# Patient Record
Sex: FEMALE | Race: AMERICAN INDIAN OR ALASKA NATIVE
[De-identification: names, ages, dates, MRNs, and addresses within clinical notes are randomized per-mention and may not be internally consistent; named-entity substitution may affect disease eponyms.]

---

## 2018-08-27 ENCOUNTER — HOSPITAL ENCOUNTER (INPATIENT)
Dept: HOSPITAL 5 - ED | Age: 68
LOS: 1 days | Discharge: HOME | DRG: 69 | End: 2018-08-28
Attending: INTERNAL MEDICINE | Admitting: INTERNAL MEDICINE
Payer: MEDICARE

## 2018-08-27 DIAGNOSIS — E66.9: ICD-10-CM

## 2018-08-27 DIAGNOSIS — I10: ICD-10-CM

## 2018-08-27 DIAGNOSIS — G45.9: Primary | ICD-10-CM

## 2018-08-27 DIAGNOSIS — R26.81: ICD-10-CM

## 2018-08-27 DIAGNOSIS — Z82.49: ICD-10-CM

## 2018-08-27 LAB
ALBUMIN SERPL-MCNC: 3.7 G/DL (ref 3.9–5)
ALT SERPL-CCNC: 14 UNITS/L (ref 7–56)
APTT BLD: 27.7 SEC. (ref 24.2–36.6)
BASOPHILS # (AUTO): 0 K/MM3 (ref 0–0.1)
BASOPHILS NFR BLD AUTO: 0.6 % (ref 0–1.8)
BUN SERPL-MCNC: 11 MG/DL (ref 7–17)
BUN/CREAT SERPL: 18 %
CALCIUM SERPL-MCNC: 9.5 MG/DL (ref 8.4–10.2)
EOSINOPHIL # BLD AUTO: 0.2 K/MM3 (ref 0–0.4)
EOSINOPHIL NFR BLD AUTO: 2.5 % (ref 0–4.3)
HCT VFR BLD CALC: 37.3 % (ref 30.3–42.9)
HEMOLYSIS INDEX: 24
HGB BLD-MCNC: 12.2 GM/DL (ref 10.1–14.3)
INR PPP: 1 (ref 0.87–1.13)
LYMPHOCYTES # BLD AUTO: 3.4 K/MM3 (ref 1.2–5.4)
LYMPHOCYTES NFR BLD AUTO: 43.9 % (ref 13.4–35)
MCH RBC QN AUTO: 30 PG (ref 28–32)
MCHC RBC AUTO-ENTMCNC: 33 % (ref 30–34)
MCV RBC AUTO: 91 FL (ref 79–97)
MONOCYTES # (AUTO): 0.6 K/MM3 (ref 0–0.8)
MONOCYTES % (AUTO): 8.2 % (ref 0–7.3)
PLATELET # BLD: 366 K/MM3 (ref 140–440)
RBC # BLD AUTO: 4.12 M/MM3 (ref 3.65–5.03)

## 2018-08-27 PROCEDURE — 85610 PROTHROMBIN TIME: CPT

## 2018-08-27 PROCEDURE — 84484 ASSAY OF TROPONIN QUANT: CPT

## 2018-08-27 PROCEDURE — 93010 ELECTROCARDIOGRAM REPORT: CPT

## 2018-08-27 PROCEDURE — 70450 CT HEAD/BRAIN W/O DYE: CPT

## 2018-08-27 PROCEDURE — 85730 THROMBOPLASTIN TIME PARTIAL: CPT

## 2018-08-27 PROCEDURE — 85025 COMPLETE CBC W/AUTO DIFF WBC: CPT

## 2018-08-27 PROCEDURE — 71045 X-RAY EXAM CHEST 1 VIEW: CPT

## 2018-08-27 PROCEDURE — 82553 CREATINE MB FRACTION: CPT

## 2018-08-27 PROCEDURE — 93306 TTE W/DOPPLER COMPLETE: CPT

## 2018-08-27 PROCEDURE — 81001 URINALYSIS AUTO W/SCOPE: CPT

## 2018-08-27 PROCEDURE — 36415 COLL VENOUS BLD VENIPUNCTURE: CPT

## 2018-08-27 PROCEDURE — 70551 MRI BRAIN STEM W/O DYE: CPT

## 2018-08-27 PROCEDURE — 70544 MR ANGIOGRAPHY HEAD W/O DYE: CPT

## 2018-08-27 PROCEDURE — 80061 LIPID PANEL: CPT

## 2018-08-27 PROCEDURE — 93005 ELECTROCARDIOGRAM TRACING: CPT

## 2018-08-27 PROCEDURE — 80053 COMPREHEN METABOLIC PANEL: CPT

## 2018-08-27 PROCEDURE — 93880 EXTRACRANIAL BILAT STUDY: CPT

## 2018-08-27 PROCEDURE — 70496 CT ANGIOGRAPHY HEAD: CPT

## 2018-08-27 PROCEDURE — 85670 THROMBIN TIME PLASMA: CPT

## 2018-08-27 PROCEDURE — 70498 CT ANGIOGRAPHY NECK: CPT

## 2018-08-27 PROCEDURE — 82962 GLUCOSE BLOOD TEST: CPT

## 2018-08-27 PROCEDURE — 82550 ASSAY OF CK (CPK): CPT

## 2018-08-27 NOTE — CAT SCAN REPORT
FINAL REPORT



EXAM:  CT ANGIO NECK



HISTORY:  Slurred Speech 



COMPARISON:  None.



TECHNIQUE:  Multiple contiguous axial images were obtained

through the neck without administration of IV contrast.

Reformatted sagittal and coronal images were available for

review. 3D reconstruction of the vascular structures was also

performed.



FINDINGS:  

There is a conventional branching pattern of the aortic arch.



There is no significant atherosclerotic disease.



The right common carotid artery is patent and normal in caliber.

The right internal and external carotid arteries are patent and

normal in caliber without evidence of dissection or aneurysm.

There is no stenosis.



The left common carotid artery is patent and normal in caliber.

The left internal and external carotid arteries are patent and

normal in caliber without evidence of dissection or aneurysm.

There is no stenosis.



The bilateral vertebral arteries are patent and normal in

caliber.



There is a heterogeneous appearing thyroid gland. The airway is

patent. The lung apices are clear.



The visualized bones are normal.



IMPRESSION:  

Normal CT angiogram of the neck. No evidence for stenosis.

## 2018-08-27 NOTE — EMERGENCY DEPARTMENT REPORT
ED Neuro Deficit HPI





- General


Chief Complaint: Neuro Symptoms/Deficit


Stated Complaint: POSS CVA


Time Seen by Provider: 08/27/18 10:31


Source: patient, EMS


Mode of arrival: Stretcher


Limitations: No Limitations





- History of Present Illness


Initial Comments: 





According to EMS, patient was last seen well yesterday before she went to bed 

last night.  She had slowed speech and unsteady gait last night before she went 

to bed.  She woke up this morning with the same symptoms of slow speech and 

unsteady gait.  Patient's family called EMS because of the symptoms.  On arrival

, EMS noted his blood pressure was 178/110.  Patient obviously has slurred 

speech.


-: Sudden, Last night


Last Observed Normal: 13:00


Location: speech


Presenting Symptoms: Present: Unable to Speak Clearly


History of same: No


Place: home


Severity: Unable to Determine


Quality: constant


Improves With: none


Worsens With: none


Associated Symptoms: confusion


Treatments Prior to Arrival: none





- Related Data


Home Medications: 


 Home Medications











 Medication  Instructions  Recorded  Confirmed  Last Taken


 


No Known Home Medications [No  08/27/18 08/27/18 Unknown





Reported Home Medications]    











Allergies/Adverse Reactions: 


 Allergies











Allergy/AdvReac Type Severity Reaction Status Date / Time


 


No Known Allergies Allergy   Unverified 08/27/18 10:35














ED Review of Systems


ROS: 


Stated complaint: POSS CVA


Other details as noted in HPI





Comment: All other systems reviewed and negative


Constitutional: denies: chills, fever


Eyes: denies: eye pain


ENT: denies: ear pain, throat pain


Respiratory: denies: cough, shortness of breath


Cardiovascular: denies: chest pain, palpitations


Endocrine: no symptoms reported


Gastrointestinal: denies: abdominal pain, nausea, vomiting


Genitourinary: denies: urgency, dysuria


Musculoskeletal: denies: back pain


Skin: denies: rash, lesions


Neurological: denies: headache, weakness, numbness


Psychiatric: denies: anxiety, depression, auditory hallucinations


Hematological/Lymphatic: denies: easy bleeding, easy bruising





ED Past Medical Hx





- Past Medical History


Hx Hypertension: Yes





- Social History


Smoking Status: Never Smoker


Substance Use Type: None





- Medications


Home Medications: 


 Home Medications











 Medication  Instructions  Recorded  Confirmed  Last Taken  Type


 


No Known Home Medications [No  08/27/18 08/27/18 Unknown History





Reported Home Medications]     














ED Neuro Physical Exam





- General


Limitations: No Limitations


General appearance: alert, in no apparent distress


Suspected Stroke: Yes





- Head


Head exam: Present: atraumatic, normocephalic, normal inspection





- Eye


Eye exam: Present: normal appearance, PERRL, EOMI


Pupils: Present: normal accommodation





- ENT


ENT exam: Present: normal exam, normal orophraynx, mucous membranes moist





- Neck


Neck exam: Present: normal inspection, full ROM.  Absent: tenderness





- Respiratory


Respiratory exam: Present: normal lung sounds bilaterally.  Absent: respiratory 

distress, wheezes, rales, rhonchi, stridor





- Cardiovascular


Cardiovascular Exam: Present: regular rate, normal rhythm, normal heart sounds





- GI/Abdominal


GI/Abdominal exam: Present: soft, normal bowel sounds.  Absent: distended, 

tenderness, guarding, rebound





- Extremities Exam


Extremities exam: Present: normal inspection, full ROM, normal capillary refill





- Back Exam


Back exam: Present: normal inspection, full ROM.  Absent: tenderness





- Neurological Exam


Neurological exam: Present: alert, oriented X3, CN II-XII intact





- NIHSS


Assessment Interval: Baseline


1a. Level of Consciousness: alert


1b. LOC Questions: answers correctly


1c. LOC Commands: performs tasks correctly


2. Best Gaze: normal


3. Visual: no visual loss


4. Facial Palsy: normal symmetrical movement


5b. Motor Arm Right: no drift


5a. Motor Arm Left: no drift


6a. Motor Leg Left: no drift


6b. Motor Leg Right: no drift


7. Limb Ataxia: absent


8. Sensory: normal


9. Best Language: mild/moderate aphasia


10. Dysarthria: mild/moderate dysarthria


11. Extinction/Inattention: no abnormality


Total Score: 2


Stroke Severity: Minor Stroke





- Psychiatric


Psychiatric exam: Present: normal affect





- Skin


Skin exam: Present: warm, dry, intact, normal color.  Absent: rash





ED Course


 Vital Signs











  08/27/18 08/27/18 08/27/18





  11:00 11:30 12:55


 


Pulse Rate  83 


 


Respiratory 16 28 H 





Rate   


 


Blood Pressure  153/92 153/92


 


O2 Sat by Pulse 98 99 100





Oximetry   














  08/27/18 08/27/18 08/27/18





  13:00 13:30 16:14


 


Pulse Rate  78 


 


Respiratory  17 





Rate   


 


Blood Pressure 157/84 154/85 154/85


 


O2 Sat by Pulse 100 96 97





Oximetry   














  08/27/18 08/27/18





  16:30 17:00


 


Pulse Rate  


 


Respiratory  





Rate  


 


Blood Pressure 156/88 145/82


 


O2 Sat by Pulse 100 98





Oximetry  














- Reevaluation(s)


Reevaluation #1: 





08/27/18 10:53


I consulted with the tele neurologist on call Dr. Janette Donnelly.  She recommend 

given the patient aspirin 81 mg by mouth.  She also patient admitted by the 

hospitalist for further evaluation and management.


08/27/18 13:07


I discussed patient with the hospitalist on call Dr. Spencer.  He will admit 

patient to the hospital for further evaluation and management.


08/27/18 13:38








- Lab Data


Result diagrams: 


 08/27/18 10:44





 08/27/18 10:44


 Lab Results











  08/27/18 08/27/18 08/27/18 Range/Units





  10:27 10:44 10:44 


 


WBC   7.8   (4.5-11.0)  K/mm3


 


RBC   4.12   (3.65-5.03)  M/mm3


 


Hgb   12.2   (10.1-14.3)  gm/dl


 


Hct   37.3   (30.3-42.9)  %


 


MCV   91   (79-97)  fl


 


MCH   30   (28-32)  pg


 


MCHC   33   (30-34)  %


 


RDW   13.2   (13.2-15.2)  %


 


Plt Count   366   (140-440)  K/mm3


 


Lymph % (Auto)   43.9 H   (13.4-35.0)  %


 


Mono % (Auto)   8.2 H   (0.0-7.3)  %


 


Eos % (Auto)   2.5   (0.0-4.3)  %


 


Baso % (Auto)   0.6   (0.0-1.8)  %


 


Lymph #   3.4   (1.2-5.4)  K/mm3


 


Mono #   0.6   (0.0-0.8)  K/mm3


 


Eos #   0.2   (0.0-0.4)  K/mm3


 


Baso #   0.0   (0.0-0.1)  K/mm3


 


Seg Neutrophils %   44.8   (40.0-70.0)  %


 


Seg Neutrophils #   3.5   (1.8-7.7)  K/mm3


 


PT    13.7  (12.2-14.9)  Sec.


 


INR    1.00  (0.87-1.13)  


 


APTT    27.7  (24.2-36.6)  Sec.


 


Thrombin Time    18.4  (15.1-19.6)  Sec.


 


Sodium     (137-145)  mmol/L


 


Potassium     (3.6-5.0)  mmol/L


 


Chloride     ()  mmol/L


 


Carbon Dioxide     (22-30)  mmol/L


 


Anion Gap     mmol/L


 


BUN     (7-17)  mg/dL


 


Creatinine     (0.7-1.2)  mg/dL


 


Estimated GFR     ml/min


 


BUN/Creatinine Ratio     %


 


Glucose     ()  mg/dL


 


POC Glucose  87    ()  


 


Calcium     (8.4-10.2)  mg/dL


 


Total Bilirubin     (0.1-1.2)  mg/dL


 


AST     (5-40)  units/L


 


ALT     (7-56)  units/L


 


Alkaline Phosphatase     ()  units/L


 


Total Creatine Kinase     ()  units/L


 


CK-MB (CK-2)     (0.0-4.0)  ng/mL


 


CK-MB (CK-2) Rel Index     (0-4)  


 


Troponin T     (0.00-0.029)  ng/mL


 


Total Protein     (6.3-8.2)  g/dL


 


Albumin     (3.9-5)  g/dL


 


Albumin/Globulin Ratio     %














  08/27/18 Range/Units





  10:44 


 


WBC   (4.5-11.0)  K/mm3


 


RBC   (3.65-5.03)  M/mm3


 


Hgb   (10.1-14.3)  gm/dl


 


Hct   (30.3-42.9)  %


 


MCV   (79-97)  fl


 


MCH   (28-32)  pg


 


MCHC   (30-34)  %


 


RDW   (13.2-15.2)  %


 


Plt Count   (140-440)  K/mm3


 


Lymph % (Auto)   (13.4-35.0)  %


 


Mono % (Auto)   (0.0-7.3)  %


 


Eos % (Auto)   (0.0-4.3)  %


 


Baso % (Auto)   (0.0-1.8)  %


 


Lymph #   (1.2-5.4)  K/mm3


 


Mono #   (0.0-0.8)  K/mm3


 


Eos #   (0.0-0.4)  K/mm3


 


Baso #   (0.0-0.1)  K/mm3


 


Seg Neutrophils %   (40.0-70.0)  %


 


Seg Neutrophils #   (1.8-7.7)  K/mm3


 


PT   (12.2-14.9)  Sec.


 


INR   (0.87-1.13)  


 


APTT   (24.2-36.6)  Sec.


 


Thrombin Time   (15.1-19.6)  Sec.


 


Sodium  141  (137-145)  mmol/L


 


Potassium  4.1  (3.6-5.0)  mmol/L


 


Chloride  104.6  ()  mmol/L


 


Carbon Dioxide  26  (22-30)  mmol/L


 


Anion Gap  15  mmol/L


 


BUN  11  (7-17)  mg/dL


 


Creatinine  0.6 L  (0.7-1.2)  mg/dL


 


Estimated GFR  > 60  ml/min


 


BUN/Creatinine Ratio  18  %


 


Glucose  92  ()  mg/dL


 


POC Glucose   ()  


 


Calcium  9.5  (8.4-10.2)  mg/dL


 


Total Bilirubin  0.30  (0.1-1.2)  mg/dL


 


AST  21  (5-40)  units/L


 


ALT  14  (7-56)  units/L


 


Alkaline Phosphatase  118  ()  units/L


 


Total Creatine Kinase  191 H  ()  units/L


 


CK-MB (CK-2)  1.8  (0.0-4.0)  ng/mL


 


CK-MB (CK-2) Rel Index  0.9  (0-4)  


 


Troponin T  < 0.010  (0.00-0.029)  ng/mL


 


Total Protein  7.4  (6.3-8.2)  g/dL


 


Albumin  3.7 L  (3.9-5)  g/dL


 


Albumin/Globulin Ratio  1.0  %














- EKG Data


-: EKG Interpreted by Me


EKG shows normal: sinus rhythm


Rate: normal (80)


When compared to previous EKG there are: previous EKG unavailable


Interpretation: normal EKG





08/27/18 13:35


No STEMI





- Radiology Data


Radiology results: report reviewed, image reviewed





- Core Measures


AMI Core Measures Followed: Yes





- Thrombolytic Inclusion/Exclusion


Thrombolytic Exclusion Criteria: Symptom Onset > 3 Hours


Critical Care Time: Yes


Critical care time in (mins) excluding proc time.: 40


Critical care attestation.: 


If time is entered above; I have spent that time in minutes in the direct care 

of this critically ill patient, excluding procedure time.








ED Disposition


Clinical Impression: 


 Slurred speech, Unsteady gait





Hypertension


Qualifiers:


 Hypertension type: essential hypertension Qualified Code(s): I10 - Essential (

primary) hypertension





Disposition: DC-09 OP ADMIT IP TO THIS HOSP


Is pt being admited?: Yes


Does the pt Need Aspirin: Yes


Condition: Stable


Time of Disposition: 13:06

## 2018-08-27 NOTE — CAT SCAN REPORT
FINAL REPORT



EXAM:  CT ANGIO HEAD



HISTORY:  Slurred Speech 



COMPARISON:  None.



TECHNIQUE:  Multiple contiguous axial images were obtained

through the head after administration of IV contrast. 3D 

reconstruction of the vascular structures was also performed.



FINDINGS:  

The bilateral internal carotid arteries are patent and normal in

caliber without evidence for stenosis, aneurysm, or dissection.



The bilateral middle cerebral arteries and anterior cerebral

arteries are patent and normal in caliber.



The basilar artery is patent and normal in caliber. The posterior

cerebral arteries are patent and normal in caliber.







IMPRESSION:  

Normal CT angiogram of the head without dissection, aneurysm, or

stenosis.

## 2018-08-27 NOTE — HISTORY AND PHYSICAL REPORT
History of Present Illness


Chief complaint: 


I feel weak, and i cant talk





History of present illness: 


68 YO Female with HTN, Obesity presents to ED for evaluation. Pt states that 

she was in her usual state of health at bedtime which was around 2100 hrs. Pt 

states that she awoke this morning and was unable to walk, and had slurred 

speech. EMS was notified, and upon arrival the patient was found to have 

slurred speech, and systolic blood pressure of 178. A code stroke was called, 

and the patient was transported to [facility] for further care and evaluation. Pt seen 

and evaluated in ED and found to have symptoms consistent with Acute CVA. 

Teleneurology consulted. Pt outside therapeutic window for TPA. Pt admitted to 

telemetry and initiated on CVA Protocol. No reports to fever, chills, CP, 

Palpitations, NVD, Syncope, Trauma, Seizure, Skin rash, BRBPR, recent ill 

contacts, unilateral leg swelling, calf pain, productive cough, prolonged travel

/immobility, individual/family history of DVT/PE. 





Past History


Past Medical History: hypertension, other (Obesity)


Past Surgical History: No surgical history, Other (reviewed)


Social history: .  denies: smoking, alcohol abuse


Family history: hypertension





Medications and Allergies


 Allergies











Allergy/AdvReac Type Severity Reaction Status Date / Time


 


No Known Allergies Allergy   Unverified 08/27/18 10:35











 Home Medications











 Medication  Instructions  Recorded  Confirmed  Last Taken  Type


 


No Known Home Medications [No  08/27/18 08/27/18 Unknown History





Reported Home Medications]     














Review of Systems


Constitutional: no weight loss, no weight gain, no fever, no chills





Exam





- Constitutional


Vitals: 


 











Temp Pulse Resp BP Pulse Ox


 


    83   28 H  153/92   100 


 


    08/27/18 11:30  08/27/18 11:30  08/27/18 12:55  08/27/18 12:55











General appearance: Present: mild distress, obese





- EENT


Eyes: Present: PERRL


ENT: hearing intact, clear oral mucosa





- Neck


Neck: Present: supple, normal ROM





- Respiratory


Respiratory effort: normal


Respiratory: bilateral: CTA





- Cardiovascular


Heart Sounds: Present: S1 & S2.  Absent: rub, click





- Extremities


Extremities: pulses symmetrical, No edema


Peripheral Pulses: within normal limits





- Abdominal


General gastrointestinal: Present: soft, non-tender, non-distended, normal 

bowel sounds


Female genitourinary: Present: normal





- Integumentary


Integumentary: Present: clear, warm, dry





- Musculoskeletal


Musculoskeletal: generalized weakness





- Psychiatric


Psychiatric: intact judgment & insight, memory intact





- Neurologic


Neurologic: no CNII-XII intact, no gait normal





Results





- Labs


CBC & Chem 7: 


 08/27/18 10:44





 08/27/18 10:44


Labs: 


 Abnormal lab results











  08/27/18 08/27/18 Range/Units





  10:44 10:44 


 


Lymph % (Auto)  43.9 H   (13.4-35.0)  %


 


Mono % (Auto)  8.2 H   (0.0-7.3)  %


 


Creatinine   0.6 L  (0.7-1.2)  mg/dL


 


Total Creatine Kinase   191 H  ()  units/L


 


Albumin   3.7 L  (3.9-5)  g/dL














Assessment and Plan





- Patient Problems


(1) CVA (cerebral vascular accident)


Current Visit: Yes   Status: Acute   


Qualifiers: 


   Laterality of affected vessel: unspecified 


Plan to address problem: 


Stroke Protocol: CT head, MRI Brain, MRA Brain, Echo, Carotid Doppler, 

Antiplatelet therapy, lipid panel, PT/OT/Speech therapy, 








(2) Obesity (BMI 30.0-34.9)


Current Visit: Yes   Status: Acute   


Plan to address problem: 


Balanced diet, increased physical activity, 








(3) Hypertension


Current Visit: Yes   Status: Acute   


Qualifiers: 


   Hypertension type: essential hypertension   Qualified Code(s): I10 - 

Essential (primary) hypertension   


Plan to address problem: 


Monitor BP q shift, continue medical management. 








(4) DVT prophylaxis


Current Visit: Yes   Status: Acute   


Plan to address problem: 


SCD to BLE while in bed.

## 2018-08-27 NOTE — XRAY REPORT
AP CHEST:



HISTORY: Cough



AP view of the chest demonstrates a normal mediastinal and

cardiac contour with clear lungs and normal bony and soft tissue

structures.



IMPRESSION:

Unremarkable AP chest.

## 2018-08-27 NOTE — CAT SCAN REPORT
CT HEAD WITHOUT CONTRAST:



HISTORY:  Stroke symptoms.



TECHNIQUE:  Sequential 2.5mm CT images.



COMPARISON: none.



FINDINGS:



Cerebral Parenchyma: Within normal limits.



Cerebellum:  Within normal limits.



Brainstem:  Within normal limits.



Ventricles: Normal.



Sella:  Normal.



Extra-axial spaces:  Normal.



Basal Cisterns:  Normal.



Intracranial Hemorrhage:  None.



Midline Shift:  None.



Calvarium: Normal.



Sinuses: Normal.



Mastoid Air Cells:  Normal.



Visualized Orbits:  Normal.







IMPRESSION:

Cranial CT scan within normal limits.



These findings were discussed with Dr. Singer in the emergency 

department at 1048 hrs.

## 2018-08-28 VITALS — SYSTOLIC BLOOD PRESSURE: 130 MMHG | DIASTOLIC BLOOD PRESSURE: 79 MMHG

## 2018-08-28 LAB
BILIRUB UR QL STRIP: (no result)
BLOOD UR QL VISUAL: (no result)
HDLC SERPL-MCNC: 87 MG/DL (ref 40–59)
MUCOUS THREADS #/AREA URNS HPF: (no result) /HPF
PH UR STRIP: 5 [PH] (ref 5–7)
PROT UR STRIP-MCNC: (no result) MG/DL
RBC #/AREA URNS HPF: < 1 /HPF (ref 0–6)
UROBILINOGEN UR-MCNC: < 2 MG/DL (ref ?–2)
WBC #/AREA URNS HPF: 1 /HPF (ref 0–6)

## 2018-08-28 NOTE — DISCHARGE SUMMARY
Providers





- Providers


Date of Admission: 


08/27/18 13:35





Attending physician: 


DENEEN BEYER MD





 





08/27/18 13:35


Occupational Therapy Evaluate and Treat [CONS] Routine 


   Comment: 


   Reason For Exam: Neuro deficits


Physical Therapy Evaluation and Treat [CONS] Routine 


   Comment: 


   Reason For Exam: Neuro deficits





08/27/18 13:36


Speech Therapy Evaluation and Treat [CONS] Routine 


   Reason For Exam: swallow eval











Primary care physician: 


PRIMARY CARE MD








Hospitalization


Reason for admission: TIA


Condition: Stable


Hospital course: 


68 YO Female with HTN, Obesity presents to ED for evaluation. Pt states that 

she was in her usual state of health at bedtime which was around 2100 hrs. Pt 

states that she awoke this morning and was unable to walk, and had slurred 

speech. EMS was notified, and upon arrival the patient was found to have 

slurred speech, and systolic blood pressure of 178. A code stroke was called, 

and the patient was transported to [facility] for further care and evaluation. Pt seen 

and evaluated in ED and found to have symptoms consistent with Acute CVA. 

Teleneurology consulted. Pt outside therapeutic window for TPA. Pt admitted to 

telemetry and initiated on CVA Protocol. No reports to fever, chills, CP, 

Palpitations, NVD, Syncope, Trauma, Seizure, Skin rash, BRBPR, recent ill 

contacts, unilateral leg swelling, calf pain, productive cough, prolonged travel

/immobility, individual/family history of DVT/PE.  On admission on the left 

evaluation of the patient's symptoms had resolved.  Initial CT of the brain was 

negative.  The patient was very reluctant to speak with me first it was stated 

later that she had run out of her medications and was not able to feel them due 

to finances.  I did discuss the findings of our tests so far now summation of 

clinical diagnosis.  I did also recommend the patient I will would use 

medications that are on the $4 list at [facility].  She verbalized 

understanding and MRI was obtained.





(1) TIA


(2) Obesity (BMI 30.0-34.9)


(3) Hypertension





Disposition: DC-01 TO HOME OR SELFCARE


Time spent for discharge: 35 mins





Core Measure Documentation





- Palliative Care


Palliative Care/ Comfort Measures: Not Applicable





- Core Measures


Any of the following diagnoses?: none





- VTE Discharge Requirements


Deep Vein Thrombosis/Pulmonary Embolism Present on Admission: No





Exam





- Physical Exam


Narrative exam: 


 VITAL SIGNS:  Reviewed.    


GENERAL:  The patient appeared well nourished and normally developed. Vital 

signs as documented.


HEAD:  No signs of head trauma.


EYES:  Pupils are equal.  Extraocular motions intact.  


EARS:  Hearing grossly intact.


MOUTH:  Oropharynx is normal. 


NECK:  No adenopathy, no JVD.   


CHEST:  Chest with clear breath sounds bilaterally.  No wheezes, rales, or 

rhonchi.  


CARDIAC:  Regular rate and rhythm.  S1 and S2, without murmurs, gallops, or 

rubs.


VASCULAR:  No Edema.  Peripheral pulses normal and equal in all extremities.


ABDOMEN:  Soft, without detectable tenderness.  No sign of distention.  No   

rebound or guarding, and no masses palpated.   Bowel Sounds normal.


MUSCULOSKELETAL:  Good range of motion of all major joints. Extremities without 

clubbing, cyanosis or edema.  


NEUROLOGIC EXAM:  Alert and oriented x 3.  No focal sensory or strength 

deficits.   Speech normal.  Follows commands.


PSYCHIATRIC:  Mood normal.


SKIN:  No rash or lesions.














- Constitutional


Vitals: 


 











Temp Pulse Resp BP Pulse Ox


 


 97.9 F   81   21   116/63   95 


 


 08/28/18 07:40  08/28/18 10:00  08/28/18 10:00  08/28/18 07:40  08/28/18 07:40














Plan


Activity: advance as tolerated, fall precautions


Diet: low cholesterol, low salt


Special Instructions: record daily BP diary


Follow up with: 


PRIMARY CARE,MD [Primary Care Provider] - 3-5 Days


Prescriptions: 


Lovastatin [Altoprev] 80 mg PO QHS #120 tab.er.24h


Aspirin [Aspirin TAB] 325 mg PO QDAY #30 tablet


Lisinopril [Zestril TAB] 20 mg PO QDAY #30 tablet

## 2018-08-28 NOTE — MAGNETIC RESONANCE REPORT
FINAL REPORT



EXAM:  MR BRAIN WO CON



HISTORY:  stroke 



TECHNIQUE:  Multiplanar MRI of the brain. Axial T1 or coronal

FLAIR sequences not obtained due to technical difficulties. No

contrast administered.



PRIORS:  CTA brain, 27 August 2018.



FINDINGS:  

Brain volume is normal for age. No acute infarct seen on

diffusion-weighted imaging. No parenchymal mass, mass-effect,

hemorrhage, midline shift or hydrocephalus. No pathologic

extra-axial fluid collection. No pineal region or sellar masses.

Cerebellar tonsils extend approximately 7 mm caudal to foramen

magnum level. No appreciable kinking of upper spinal cord. 



Mild mucosal thickening in the ethmoid sinuses. 



IMPRESSION:  

1. No acute intracranial findings. 



2. Findings compatible with Chiari 1 malformation.

## 2022-07-20 ENCOUNTER — OFFICE VISIT (OUTPATIENT)
Dept: URBAN - METROPOLITAN AREA CLINIC 109 | Facility: CLINIC | Age: 72
End: 2022-07-20

## 2022-07-20 RX ORDER — OMEPRAZOLE 20 MG/1
CAPSULE, DELAYED RELEASE ORAL
Status: ACTIVE | COMMUNITY

## 2023-01-06 ENCOUNTER — TELEPHONE ENCOUNTER (OUTPATIENT)
Dept: URBAN - METROPOLITAN AREA CLINIC 109 | Facility: CLINIC | Age: 73
End: 2023-01-06

## 2023-01-06 ENCOUNTER — OFFICE VISIT (OUTPATIENT)
Dept: URBAN - METROPOLITAN AREA CLINIC 109 | Facility: CLINIC | Age: 73
End: 2023-01-06
Payer: MEDICARE

## 2023-01-06 VITALS
TEMPERATURE: 97.9 F | HEIGHT: 63 IN | HEART RATE: 96 BPM | SYSTOLIC BLOOD PRESSURE: 139 MMHG | DIASTOLIC BLOOD PRESSURE: 86 MMHG | BODY MASS INDEX: 34.91 KG/M2 | WEIGHT: 197 LBS

## 2023-01-06 DIAGNOSIS — Z12.11 SCREEN FOR COLON CANCER: ICD-10-CM

## 2023-01-06 PROCEDURE — 99202 OFFICE O/P NEW SF 15 MIN: CPT | Performed by: INTERNAL MEDICINE

## 2023-01-06 RX ORDER — POLYETHYLENE GLYCOL-3350 AND ELECTROLYTES 236; 6.74; 5.86; 2.97; 22.74 G/274.31G; G/274.31G; G/274.31G; G/274.31G; G/274.31G
AS DIRECTED POWDER, FOR SOLUTION ORAL ONCE
Qty: 1 KIT | Refills: 0 | OUTPATIENT
Start: 2023-01-06 | End: 2023-01-07

## 2023-01-06 RX ORDER — OMEPRAZOLE 20 MG/1
CAPSULE, DELAYED RELEASE ORAL
Status: ACTIVE | COMMUNITY

## 2023-01-06 NOTE — HPI-TODAY'S VISIT:
accompanied by her younger brother  Patient here for routine colonoscopy evaluation. The patient denies specific GI complaints with no rectal bleeding, abdominal pain, or change in bowel habits

## 2023-05-17 ENCOUNTER — DASHBOARD ENCOUNTERS (OUTPATIENT)
Age: 73
End: 2023-05-17

## 2023-05-31 ENCOUNTER — OFFICE VISIT (OUTPATIENT)
Dept: URBAN - METROPOLITAN AREA CLINIC 109 | Facility: CLINIC | Age: 73
End: 2023-05-31

## 2023-05-31 RX ORDER — OMEPRAZOLE 20 MG/1
CAPSULE, DELAYED RELEASE ORAL
COMMUNITY